# Patient Record
Sex: FEMALE | Race: WHITE | NOT HISPANIC OR LATINO
[De-identification: names, ages, dates, MRNs, and addresses within clinical notes are randomized per-mention and may not be internally consistent; named-entity substitution may affect disease eponyms.]

---

## 2024-04-09 ENCOUNTER — NON-APPOINTMENT (OUTPATIENT)
Age: 79
End: 2024-04-09

## 2024-04-09 PROBLEM — Z00.00 ENCOUNTER FOR PREVENTIVE HEALTH EXAMINATION: Status: ACTIVE | Noted: 2024-04-09

## 2024-04-11 ENCOUNTER — APPOINTMENT (OUTPATIENT)
Dept: PULMONOLOGY | Facility: CLINIC | Age: 79
End: 2024-04-11
Payer: MEDICARE

## 2024-04-11 VITALS
WEIGHT: 107 LBS | DIASTOLIC BLOOD PRESSURE: 90 MMHG | BODY MASS INDEX: 19.69 KG/M2 | HEIGHT: 62 IN | TEMPERATURE: 98.3 F | SYSTOLIC BLOOD PRESSURE: 170 MMHG | HEART RATE: 78 BPM | OXYGEN SATURATION: 97 %

## 2024-04-11 DIAGNOSIS — R06.2 WHEEZING: ICD-10-CM

## 2024-04-11 PROCEDURE — G2211 COMPLEX E/M VISIT ADD ON: CPT

## 2024-04-11 PROCEDURE — 99203 OFFICE O/P NEW LOW 30 MIN: CPT

## 2024-04-11 RX ORDER — AZITHROMYCIN 250 MG/1
250 TABLET, FILM COATED ORAL
Refills: 0 | Status: ACTIVE | COMMUNITY

## 2024-04-11 RX ORDER — FLUTICASONE PROPIONATE AND SALMETEROL 250; 50 UG/1; UG/1
250-50 POWDER RESPIRATORY (INHALATION) TWICE DAILY
Qty: 1 | Refills: 5 | Status: ACTIVE | COMMUNITY
Start: 2024-04-11 | End: 1900-01-01

## 2024-04-11 RX ORDER — FLUTICASONE PROPIONATE 50 UG/1
50 SPRAY, METERED NASAL
Refills: 0 | Status: ACTIVE | COMMUNITY

## 2024-04-11 RX ORDER — PREDNISONE 10 MG/1
10 TABLET ORAL
Refills: 0 | Status: ACTIVE | COMMUNITY

## 2024-04-11 RX ORDER — LOSARTAN POTASSIUM 50 MG/1
50 TABLET, FILM COATED ORAL
Refills: 0 | Status: ACTIVE | COMMUNITY

## 2024-04-11 NOTE — HISTORY OF PRESENT ILLNESS
[Never] : never [TextBox_4] :  Ms. MATT FERRELL is a 78 year old F here for evaluation of cough, shortness of breath. She is accompanied by a family member.  In Jan 2024 Pt went to  for fatigue, chills, and elevated BP. She tested +COVID and was sent to [facility] ED for elevated BP. She was discharged home from ED with new BP medications. She then felt persistent shortness of breath and sensation of choking/coughing during expiration. She was evaluated by PCP (KIMBERLY Luis) who sent her to ED 4/2024 to r/o pneumonia. During her most recent ED visit, she underwent CXR and CT chest and was given a course of prednisone. Since discharge from ED she has had f/u with her PCP and was continued on a longer prednisone taper and also started on z-pack. She was referred to Pulmonary for further evaluation.  Pt notes currently that she still has shortness of breath on exertion but the primary thing bothering her is a sensation of choking/cough when she exhales. She knows her lungs are clear but that the problem appears to be in her throat. She also notes a h/o nasal polyp and she is scheduled to see ENT for evaluation soon. She denies personal history of respiratory illnesses including childhood asthma. She does note about a 10 year h/o 2nd hand smoking as a child living with her mother who smoked indoors. She personally has never smoked. Prior to her COVID infection, she had no significant respiratory or medical illnesses.

## 2024-04-11 NOTE — REVIEW OF SYSTEMS
[Nasal Congestion] : nasal congestion [Sinus Problems] : sinus problems [Cough] : cough [Chest Tightness] : no chest tightness [Wheezing] : wheezing [SOB on Exertion] : sob on exertion [Seasonal Allergies] : seasonal allergies [Nasal Discharge] : nasal discharge [Negative] : Musculoskeletal

## 2024-04-11 NOTE — DISCUSSION/SUMMARY
[FreeTextEntry1] : 78F with newly diagnosed HTN, recent COVID infection here for evaluation of cough/shortness of breath  #Cough #Shortness of breath #Abnormal CT chest  - CT images reviewed with patient. She has some nodular contour in her trachea likely representing retained secretions. She also has some minor pleural nodularities in upper lobes. Otherwise no acute parenchymal infiltrates or suspicious findings - Discussed that her symptoms are suggestive of reactive airways. Of note, her CBC at most recent ED visit showed elevated peripheral eos - Will check PFTs with FeNO now and empirically start inhaler therapy. She is to complete steroid taper   RTC 6 weeks for close follow-up

## 2024-04-11 NOTE — PHYSICAL EXAM
[No Acute Distress] : no acute distress [Normal Oropharynx] : normal oropharynx [No Resp Distress] : no resp distress [Clear to Auscultation Bilaterally] : clear to auscultation bilaterally [No Clubbing] : no clubbing [No Cyanosis] : no cyanosis [No Edema] : no edema [Oriented x3] : oriented x3 [Normal Affect] : normal affect [TextBox_11] : no stridor [TextBox_68] : initial inspiratory wheezes/squeak which cleared with repeated breaths

## 2024-05-17 ENCOUNTER — NON-APPOINTMENT (OUTPATIENT)
Age: 79
End: 2024-05-17

## 2024-05-23 ENCOUNTER — APPOINTMENT (OUTPATIENT)
Dept: PULMONOLOGY | Facility: CLINIC | Age: 79
End: 2024-05-23
Payer: MEDICARE

## 2024-05-23 VITALS
OXYGEN SATURATION: 96 % | HEART RATE: 73 BPM | WEIGHT: 107 LBS | DIASTOLIC BLOOD PRESSURE: 90 MMHG | BODY MASS INDEX: 19.69 KG/M2 | HEIGHT: 62 IN | SYSTOLIC BLOOD PRESSURE: 140 MMHG

## 2024-05-23 DIAGNOSIS — R06.02 SHORTNESS OF BREATH: ICD-10-CM

## 2024-05-23 PROCEDURE — G2211 COMPLEX E/M VISIT ADD ON: CPT

## 2024-05-23 PROCEDURE — 99213 OFFICE O/P EST LOW 20 MIN: CPT

## 2024-05-23 NOTE — DISCUSSION/SUMMARY
[FreeTextEntry1] : 78F with newly diagnosed HTN, recent COVID infection here for evaluation of cough/shortness of breath  #Cough #Shortness of breath #Abnormal CT chest  - PFTs reviewed with Pt. She has mild obstruction without significant BD response, however her FEV1/FVC ratio did normalize after BD challenge indicating she may have some reversibility of airflow. Given the clinical context she likely has mild asthma. FeNO is low. - Discussed using ICS inhaler on PRN basis, especially through the allergy season and she is agreeable.

## 2024-05-23 NOTE — REVIEW OF SYSTEMS
[Nasal Congestion] : nasal congestion [Sinus Problems] : sinus problems [Cough] : cough [Wheezing] : wheezing [SOB on Exertion] : sob on exertion [Seasonal Allergies] : seasonal allergies [Nasal Discharge] : nasal discharge [Negative] : Musculoskeletal [Chest Tightness] : no chest tightness

## 2024-05-23 NOTE — HISTORY OF PRESENT ILLNESS
[Never] : never [TextBox_4] :  Ms. MATT FERRELL is a 78 year old F here for evaluation of cough, shortness of breath. She is accompanied by a family member.  In Jan 2024 Pt went to  for fatigue, chills, and elevated BP. She tested +COVID and was sent to [facility] ED for elevated BP. She was discharged home from ED with new BP medications. She then felt persistent shortness of breath and sensation of choking/coughing during expiration. She was evaluated by PCP (KIMBERLY Luis) who sent her to ED 4/2024 to r/o pneumonia. During her most recent ED visit, she underwent CXR and CT chest and was given a course of prednisone. Since discharge from ED she has had f/u with her PCP and was continued on a longer prednisone taper and also started on z-pack. She was referred to Pulmonary for further evaluation.  Pt notes currently that she still has shortness of breath on exertion but the primary thing bothering her is a sensation of choking/cough when she exhales. She knows her lungs are clear but that the problem appears to be in her throat. She also notes a h/o nasal polyp and she is scheduled to see ENT for evaluation soon. She denies personal history of respiratory illnesses including childhood asthma. She does note about a 10 year h/o 2nd hand smoking as a child living with her mother who smoked indoors. She personally has never smoked. Prior to her COVID infection, she had no significant respiratory or medical illnesses.  5/2024 Ms. FERRELL returns today for follow-up. She is accompanied by her a family member. Doing well since last visit and notes improvement in most of her symptoms. She has seen an ENT and Allergist and had allergy testing which showed she had several allergies including trees, dogs, cats, and dust. She is currently on allegra and feeling improved.

## 2024-05-23 NOTE — PHYSICAL EXAM
[No Acute Distress] : no acute distress [Normal Oropharynx] : normal oropharynx [No Resp Distress] : no resp distress [Clear to Auscultation Bilaterally] : clear to auscultation bilaterally [No Clubbing] : no clubbing [No Cyanosis] : no cyanosis [No Edema] : no edema [Oriented x3] : oriented x3 [Normal Affect] : normal affect [No Acc Muscle Use] : no acc muscle use [TextBox_11] : no stridor [TextBox_68] : no more wheezes

## 2024-09-25 ENCOUNTER — APPOINTMENT (OUTPATIENT)
Dept: PULMONOLOGY | Facility: CLINIC | Age: 79
End: 2024-09-25

## 2025-03-31 ENCOUNTER — APPOINTMENT (OUTPATIENT)
Dept: PULMONOLOGY | Facility: CLINIC | Age: 80
End: 2025-03-31

## 2025-04-24 ENCOUNTER — APPOINTMENT (OUTPATIENT)
Dept: PULMONOLOGY | Facility: CLINIC | Age: 80
End: 2025-04-24